# Patient Record
Sex: MALE | Race: WHITE | Employment: FULL TIME | URBAN - METROPOLITAN AREA
[De-identification: names, ages, dates, MRNs, and addresses within clinical notes are randomized per-mention and may not be internally consistent; named-entity substitution may affect disease eponyms.]

---

## 2021-03-20 ENCOUNTER — IMMUNIZATION (OUTPATIENT)
Dept: PRIMARY CARE CLINIC | Age: 56
End: 2021-03-20
Payer: COMMERCIAL

## 2021-03-20 PROCEDURE — 0011A COVID-19, MODERNA VACCINE 100MCG/0.5ML DOSE: CPT | Performed by: FAMILY MEDICINE

## 2021-03-20 PROCEDURE — 91301 COVID-19, MODERNA VACCINE 100MCG/0.5ML DOSE: CPT | Performed by: FAMILY MEDICINE

## 2021-04-19 ENCOUNTER — IMMUNIZATION (OUTPATIENT)
Dept: PRIMARY CARE CLINIC | Age: 56
End: 2021-04-19
Payer: COMMERCIAL

## 2021-04-19 PROCEDURE — 0012A COVID-19, MODERNA VACCINE 100MCG/0.5ML DOSE: CPT | Performed by: PHYSICIAN ASSISTANT

## 2021-04-19 PROCEDURE — 91301 COVID-19, MODERNA VACCINE 100MCG/0.5ML DOSE: CPT | Performed by: PHYSICIAN ASSISTANT

## 2021-12-01 ENCOUNTER — IMMUNIZATION (OUTPATIENT)
Dept: PRIMARY CARE CLINIC | Age: 56
End: 2021-12-01
Payer: COMMERCIAL

## 2021-12-01 PROCEDURE — 91306 COVID-19, MODERNA BOOSTER VACCINE 0.25ML DOSE: CPT | Performed by: NURSE PRACTITIONER

## 2021-12-01 PROCEDURE — 0064A COVID-19, MODERNA BOOSTER VACCINE 0.25ML DOSE: CPT | Performed by: NURSE PRACTITIONER

## 2022-12-28 ENCOUNTER — HOSPITAL ENCOUNTER (OUTPATIENT)
Age: 57
Discharge: HOME | End: 2022-12-30

## 2023-01-08 ENCOUNTER — APPOINTMENT (OUTPATIENT)
Dept: GENERAL RADIOLOGY | Age: 58
End: 2023-01-08
Payer: COMMERCIAL

## 2023-01-08 ENCOUNTER — HOSPITAL ENCOUNTER (EMERGENCY)
Age: 58
Discharge: HOME | End: 2023-01-08
Payer: COMMERCIAL

## 2023-01-08 VITALS
OXYGEN SATURATION: 99 % | SYSTOLIC BLOOD PRESSURE: 158 MMHG | DIASTOLIC BLOOD PRESSURE: 94 MMHG | TEMPERATURE: 97.8 F | HEART RATE: 91 BPM | RESPIRATION RATE: 14 BRPM | HEIGHT: 72 IN | BODY MASS INDEX: 25.06 KG/M2 | WEIGHT: 185 LBS

## 2023-01-08 DIAGNOSIS — Z23 NEED FOR TETANUS BOOSTER: ICD-10-CM

## 2023-01-08 DIAGNOSIS — S91.331A PUNCTURE WOUND OF RIGHT FOOT, INITIAL ENCOUNTER: Primary | ICD-10-CM

## 2023-01-08 DIAGNOSIS — S90.30XA CONTUSION OF DORSUM OF FOOT: ICD-10-CM

## 2023-01-08 PROCEDURE — 12001 RPR S/N/AX/GEN/TRNK 2.5CM/<: CPT

## 2023-01-08 PROCEDURE — 73630 X-RAY EXAM OF FOOT: CPT

## 2023-01-08 PROCEDURE — 2500000003 HC RX 250 WO HCPCS: Performed by: NURSE PRACTITIONER

## 2023-01-08 PROCEDURE — 6370000000 HC RX 637 (ALT 250 FOR IP): Performed by: NURSE PRACTITIONER

## 2023-01-08 PROCEDURE — 99283 EMERGENCY DEPT VISIT LOW MDM: CPT

## 2023-01-08 RX ORDER — DOXYCYCLINE HYCLATE 100 MG/1
100 CAPSULE ORAL ONCE
Status: COMPLETED | OUTPATIENT
Start: 2023-01-08 | End: 2023-01-08

## 2023-01-08 RX ORDER — DOXYCYCLINE HYCLATE 100 MG
100 TABLET ORAL 2 TIMES DAILY
Qty: 20 TABLET | Refills: 0 | Status: SHIPPED | OUTPATIENT
Start: 2023-01-08 | End: 2023-01-18

## 2023-01-08 RX ORDER — GINSENG 100 MG
CAPSULE ORAL ONCE
Status: COMPLETED | OUTPATIENT
Start: 2023-01-08 | End: 2023-01-08

## 2023-01-08 RX ORDER — LIDOCAINE HYDROCHLORIDE 10 MG/ML
20 INJECTION, SOLUTION INFILTRATION; PERINEURAL ONCE
Status: COMPLETED | OUTPATIENT
Start: 2023-01-08 | End: 2023-01-08

## 2023-01-08 RX ADMIN — LIDOCAINE HYDROCHLORIDE 20 ML: 10 INJECTION, SOLUTION INFILTRATION; PERINEURAL at 22:00

## 2023-01-08 RX ADMIN — DOXYCYCLINE HYCLATE 100 MG: 100 CAPSULE ORAL at 21:47

## 2023-01-08 RX ADMIN — BACITRACIN: 500 OINTMENT TOPICAL at 21:59

## 2023-01-08 NOTE — Clinical Note
Avery Petersonauvais was seen and treated in our emergency department on 1/8/2023. He may return to work on 01/10/2023. If you have any questions or concerns, please don't hesitate to call.       Aminata Coughlin, APRN - CNP

## 2023-01-09 ASSESSMENT — ENCOUNTER SYMPTOMS
RESPIRATORY NEGATIVE: 1
ALLERGIC/IMMUNOLOGIC NEGATIVE: 1
GASTROINTESTINAL NEGATIVE: 1
EYES NEGATIVE: 1

## 2023-01-09 NOTE — ED PROVIDER NOTES
Independent JJ Visit. [address]        Pt Name: Tawanna Coyne  MRN: [id number]  [place] 1965  Date of evaluation: 1/8/2023  Provider: DELROY Espinosa CNP  PCP: Steve Cho DO  Note Started: 7:59 PM EST 1/8/23    CHIEF COMPLAINT       Chief Complaint   Patient presents with    Foot Pain     Right foot pain, dropped tv on foot, states it hit a vein and won't stop bleeding, no thinner       HISTORY OF PRESENT ILLNESS: 1 or more Elements   History From: patient    Limitations to history : None    Tawanna Coyne is a 61 y.o. male who presents to the ED by private vehicle with spouse and reports he was watching football game with 2 TVs and one of the 42 inch TVs fell onto the dorsum of his right foot and obtained a laceration/puncture wound and it would not stop bleeding. Patient denies any pain to the area he does have moderate amount of bruising to the dorsum of the foot and a 1 cm laceration. Patient denies any previous history of anticoagulation and bleeding is controlled with pressure dressing. He was ambulatory on arrival with no assistive device. His tetanus shot is not up-to-date. He denies any history of diabetes. Nursing Notes were all reviewed and agreed with or any disagreements were addressed in the HPI. REVIEW OF SYSTEMS :      Review of Systems   Constitutional: Negative. HENT: Negative. Eyes: Negative. Respiratory: Negative. Cardiovascular: Negative. Gastrointestinal: Negative. Genitourinary: Negative. Musculoskeletal: Negative. Skin:  Positive for wound. Dorsum of right foot 1 cm laceration see photo. Allergic/Immunologic: Negative. Neurological: Negative. Negative for weakness and numbness. Psychiatric/Behavioral: Negative. All other systems reviewed and are negative. Positives and Pertinent negatives as per HPI.      SURGICAL HISTORY     Past Surgical History:   Procedure Laterality Date    APPENDECTOMY      HEMORRHOID SURGERY      KNEE ARTHROSCOPY Right 07/16/2014    Right knee arthroscopy. Medial meniscectomy. UPPER GASTROINTESTINAL ENDOSCOPY  2013       CURRENTMEDICATIONS       Discharge Medication List as of 1/8/2023 10:01 PM          ALLERGIES     Patient has no known allergies. FAMILYHISTORY     No family history on file. SOCIAL HISTORY       Social History     Tobacco Use    Smoking status: Every Day     Packs/day: 0.50     Types: Cigarettes    Smokeless tobacco: Never   Substance Use Topics    Alcohol use: Yes     Alcohol/week: 15.0 standard drinks     Types: 15 Cans of beer per week     Comment: socially    Drug use: No       SCREENINGS        Reji Coma Scale  Eye Opening: Spontaneous  Best Verbal Response: Oriented  Best Motor Response: Obeys commands  Leroy Coma Scale Score: 15                CIWA Assessment  BP: (!) 158/94  Heart Rate: 91           PHYSICAL EXAM  1 or more Elements     ED Triage Vitals [01/08/23 1926]   BP Temp Temp Source Heart Rate Resp SpO2 Height Weight   (!) 158/94 97.8 °F (36.6 °C) Tympanic 91 14 99 % 6' (1.829 m) 185 lb (83.9 kg)       Physical Exam  Vitals and nursing note reviewed. Constitutional:       General: He is awake. Appearance: Normal appearance. He is well-developed, well-groomed and normal weight. He is not ill-appearing, toxic-appearing or diaphoretic. HENT:      Head: Normocephalic and atraumatic. Nose: Nose normal.   Eyes:      Extraocular Movements: Extraocular movements intact. Conjunctiva/sclera: Conjunctivae normal.      Pupils: Pupils are equal, round, and reactive to light. Cardiovascular:      Rate and Rhythm: Normal rate. Pulses: Normal pulses. Heart sounds: Normal heart sounds. Pulmonary:      Effort: Pulmonary effort is normal.      Breath sounds: Normal breath sounds.    Abdominal:      General: Bowel sounds are normal. Musculoskeletal:      Cervical back: Normal range of motion. Right knee: Normal. No swelling, deformity or bony tenderness. Normal range of motion. No tenderness. Left knee: Normal. No swelling, deformity or bony tenderness. Normal range of motion. No tenderness. Right lower leg: Laceration and bony tenderness present. No edema. Left lower leg: Normal. No deformity, lacerations, tenderness or bony tenderness. No edema. Right foot: Normal range of motion. No deformity. Left foot: Normal range of motion. No deformity. Feet:      Right foot:      Toenail Condition: Right toenails are normal.      Left foot:      Toenail Condition: Left toenails are normal.   Skin:     Capillary Refill: Capillary refill takes less than 2 seconds. Neurological:      General: No focal deficit present. Mental Status: He is alert and oriented to person, place, and time. Cranial Nerves: No cranial nerve deficit. Sensory: No sensory deficit. Motor: No weakness. Coordination: Coordination normal.      Gait: Gait normal.      Deep Tendon Reflexes: Reflexes normal.   Psychiatric:         Mood and Affect: Mood normal.         Behavior: Behavior normal. Behavior is cooperative. Thought Content: Thought content normal.       **Informed Consent**    The patient has given verbal consent to have photos taken of right foot and electronically inserted into their ED Provider Note as part of their permanent medical record for purposes of illustration, documentation, treatment management and/or medical review. All Images taken on 1/8/23 of patient name: Mk Dodd were taken by a [facility] [place] approved registered mobile device via [employer] mobile application and transmitted then stored on a secured [employer] [facility] located within [facility].             1 cm laceration puncture wound/laceration over the mid second metatarsal region with direct visualization of the tendon. DIAGNOSTIC RESULTS       RADIOLOGY:   Non-plain film images such as CT, Ultrasound and MRI are read by the radiologist. Plain radiographic images are visualized and preliminarily interpreted by the ED Provider with the below findings:        Interpretation per the Radiologist below, if available at the time of this note:    XR FOOT RIGHT (MIN 3 VIEWS)   Final Result   No acute fractures. Soft tissue swelling.            PROCEDURES   Unless otherwise noted below, none     Lac Repair    Date/Time: 1/8/2023 9:40 PM  Performed by: DELROY Espinosa CNP  Authorized by: DELROY Espinosa CNP     Consent:     Consent obtained:  Verbal    Consent given by:  Patient    Risks, benefits, and alternatives were discussed: yes      Risks discussed:  Infection, pain and poor wound healing    Alternatives discussed:  Observation and no treatment  Universal protocol:     Procedure explained and questions answered to patient or proxy's satisfaction: yes      Relevant documents present and verified: yes      Imaging studies available: yes      Patient identity confirmed:  Verbally with patient  Anesthesia:     Anesthesia method:  Local infiltration    Local anesthetic:  Lidocaine 1% w/o epi  Laceration details:     Location:  Foot    Foot location:  Top of R foot    Length (cm):  1  Pre-procedure details:     Preparation:  Patient was prepped and draped in usual sterile fashion  Exploration:     Limited defect created (wound extended): yes      Imaging obtained: x-ray      Imaging outcome: foreign body not noted      Wound exploration: wound explored through full range of motion      Wound extent: no fascia violation noted, no foreign bodies/material noted, no muscle damage noted, no nerve damage noted, no tendon damage noted, no underlying fracture noted and no vascular damage noted      Wound extent comment:  Puncture wound  Treatment:     Area cleansed with:  Saline    Irrigation solution:  Sterile saline    Visualized foreign bodies/material removed: no      Debridement:  None    Undermining:  None  Skin repair:     Repair method:  Sutures    Suture size:  4-0    Suture material:  Nylon    Suture technique:  Simple interrupted    Number of sutures:  2  Approximation:     Approximation:  Loose  Repair type:     Repair type:  Simple  Post-procedure details:     Dressing:  Antibiotic ointment and non-adherent dressing (ace wrap)    Procedure completion:  Tolerated with difficulty        PAST MEDICAL HISTORY/Chronic Conditions Affecting Care      has a past medical history of Meniscal injury, right, initial encounter and Stomach ulcer. EMERGENCY DEPARTMENT COURSE    Vitals:    Vitals:    01/08/23 1926   BP: (!) 158/94   Pulse: 91   Resp: 14   Temp: 97.8 °F (36.6 °C)   TempSrc: Tympanic   SpO2: 99%   Weight: 185 lb (83.9 kg)   Height: 6' (1.829 m)       Patient was given the following medications:  Medications   doxycycline hyclate (VIBRAMYCIN) capsule 100 mg (100 mg Oral Given 1/8/23 2147)   lidocaine 1 % injection 20 mL (20 mLs IntraDERmal Given 1/8/23 2200)   bacitracin ointment ( Topical Given 1/8/23 2159)            Reevaluation: Discussed results of x-ray 2141         History from : Patient    Limitations to history : None    Chronic Conditions: none    CONSULTS:   None    Discussion with Other Profesionals : None    Social Determinants : None    Records Reviewed : None  MDM: This is a 69-year-old gentleman who arrives today with a puncture wound and laceration to the dorsum of his right foot from dropping a 42 inch TV onto his foot that was wearing a soft with bleeding that was controlled with a pressure dressing and he does have open wound with visualization of a tendon and had no tendon injury at this time has full range of motion and no neurologic or sensory deficits on examination. He has full flexion extension of the foot movement of all toes.   Compartments are soft and compressible in the foot. Will obtain x-ray to rule out differential diagnosis of open fracture versus simple laceration versus contusion versus puncture wound. Patient's tetanus shot was updated he did not want any medications for pain and was comfortable. He was given a dose of doxycycline prophylactically since tendon was visually seen and wound was irrigated and patient was advised on signs of infection such as fever, chills, purulent drainage, streaking up the leg or pain out of proportion to return to the ED immediately for evaluation. X-ray was negative for any acute fracture did show swelling. Patient was offered a postop shoe and reports he can wear slipper he was given a work excuse he was instructed to elevate the foot is much as possible above the level of the heart. He was given an Ace wrap additionally. Patient was advised to follow-up with PCP for suture removal in the next 10 to 12 days and can follow-up with PCP for wound reevaluation sooner if any worsening of symptoms. He will be discharged with a course of doxycycline. Patient and wife verbalized adequate understanding of discharge instructions and follow-up care. FINAL IMPRESSION      1. Puncture wound of right foot, initial encounter    2. Contusion of dorsum of foot    3. Need for tetanus booster          DISPOSITION/PLAN     DISPOSITION Decision To Discharge 01/08/2023 10:01:46 PM      [facility]:  [facility]  [id number] [phone]    Call in 1 day  to schedule follow up appointment for reevaluation, For wound re-check, For suture removal 10-12 days.     DISCHARGE MEDICATIONS:  Discharge Medication List as of 1/8/2023 10:01 PM        START taking these medications    Details   doxycycline hyclate (VIBRA-TABS) 100 MG tablet Take 1 tablet by mouth 2 times daily for 10 days, Disp-20 tablet, R-0Normal             DISCONTINUED MEDICATIONS:  Discharge Medication List as of 1/8/2023 10:01 PM (Please note that portions of this note were completed with a voice recognition program.  Efforts were made to edit the dictations but occasionally words are mis-transcribed.)    DELROY Douglas CNP (electronically signed)           DELROY Douglas CNP  01/09/23 [id number]

## 2023-01-09 NOTE — ED NOTES
Wound care. Site cleansed, pat dried, bacitracin applied, non adherent dressing applied, wrapped with kerlex then wrapped with ace wrap to affected area per provider orders.  Well tolerated     Jaleel Shoemaker, MADDISONN  34/94/89 [id number]

## 2023-01-09 NOTE — DISCHARGE INSTRUCTIONS
Elevate the foot above the level of the heart is much as possible return if you get any fever, chills, purulent drainage, red streaking up the leg or pain out of proportion.